# Patient Record
Sex: FEMALE | Race: WHITE | Employment: OTHER | URBAN - METROPOLITAN AREA
[De-identification: names, ages, dates, MRNs, and addresses within clinical notes are randomized per-mention and may not be internally consistent; named-entity substitution may affect disease eponyms.]

---

## 2020-03-17 ENCOUNTER — INITIAL CONSULT (OUTPATIENT)
Dept: GASTROENTEROLOGY | Age: 65
End: 2020-03-17
Payer: MEDICARE

## 2020-03-17 VITALS
HEIGHT: 66 IN | DIASTOLIC BLOOD PRESSURE: 70 MMHG | SYSTOLIC BLOOD PRESSURE: 122 MMHG | BODY MASS INDEX: 29.41 KG/M2 | WEIGHT: 183 LBS

## 2020-03-17 PROCEDURE — 1090F PRES/ABSN URINE INCON ASSESS: CPT | Performed by: INTERNAL MEDICINE

## 2020-03-17 PROCEDURE — 1036F TOBACCO NON-USER: CPT | Performed by: INTERNAL MEDICINE

## 2020-03-17 PROCEDURE — 1123F ACP DISCUSS/DSCN MKR DOCD: CPT | Performed by: INTERNAL MEDICINE

## 2020-03-17 PROCEDURE — G8417 CALC BMI ABV UP PARAM F/U: HCPCS | Performed by: INTERNAL MEDICINE

## 2020-03-17 PROCEDURE — 3017F COLORECTAL CA SCREEN DOC REV: CPT | Performed by: INTERNAL MEDICINE

## 2020-03-17 PROCEDURE — 4040F PNEUMOC VAC/ADMIN/RCVD: CPT | Performed by: INTERNAL MEDICINE

## 2020-03-17 PROCEDURE — G8400 PT W/DXA NO RESULTS DOC: HCPCS | Performed by: INTERNAL MEDICINE

## 2020-03-17 PROCEDURE — G8427 DOCREV CUR MEDS BY ELIG CLIN: HCPCS | Performed by: INTERNAL MEDICINE

## 2020-03-17 PROCEDURE — 99204 OFFICE O/P NEW MOD 45 MIN: CPT | Performed by: INTERNAL MEDICINE

## 2020-03-17 PROCEDURE — G8484 FLU IMMUNIZE NO ADMIN: HCPCS | Performed by: INTERNAL MEDICINE

## 2020-03-17 RX ORDER — LORATADINE 10 MG/1
10 TABLET ORAL DAILY
COMMUNITY

## 2020-03-17 RX ORDER — LEVOTHYROXINE SODIUM 0.03 MG/1
TABLET ORAL
COMMUNITY
Start: 2019-10-14

## 2020-03-17 RX ORDER — OMEPRAZOLE 40 MG/1
CAPSULE, DELAYED RELEASE ORAL DAILY
COMMUNITY

## 2020-03-17 RX ORDER — LORAZEPAM 1 MG/1
TABLET ORAL
COMMUNITY
Start: 2019-06-10

## 2020-03-17 RX ORDER — METFORMIN HYDROCHLORIDE 500 MG/1
TABLET, EXTENDED RELEASE ORAL
COMMUNITY
Start: 2020-03-09

## 2020-03-17 RX ORDER — ESTRADIOL 0.1 MG/G
CREAM VAGINAL
COMMUNITY

## 2020-03-17 RX ORDER — DIPHENHYDRAMINE HCL 25 MG
CAPSULE ORAL EVERY 6 HOURS PRN
COMMUNITY

## 2020-03-17 RX ORDER — ESCITALOPRAM OXALATE 10 MG/1
TABLET ORAL
COMMUNITY
Start: 2019-08-12

## 2020-03-17 RX ORDER — OMEPRAZOLE 20 MG/1
20 CAPSULE, DELAYED RELEASE ORAL
Qty: 90 CAPSULE | Refills: 3 | Status: SHIPPED | OUTPATIENT
Start: 2020-03-17 | End: 2020-06-15

## 2020-03-17 RX ORDER — MOMETASONE FUROATE 50 UG/1
SPRAY, METERED NASAL
COMMUNITY

## 2020-03-17 RX ORDER — ATORVASTATIN CALCIUM 10 MG/1
TABLET, FILM COATED ORAL
COMMUNITY
Start: 2020-03-04

## 2020-03-17 NOTE — PATIENT INSTRUCTIONS
is required to confirm YOU. Liver function tests - Blood tests to measure the liver function measure levels of substances produced or metabolized by the liver. These levels can help to diagnose YOU and differentiate YOU from alcoholic hepatitis. Levels of two liver enzymes (aspartate aminotransferase [AST] and alanine aminotransferase [ALT]) are elevated in about 90 percent of people with YOU. Other blood tests - Additional blood tests are useful for ruling out other causes of liver disease. These usually include tests for viral hepatitis (hepatitis A, B, or C), and may include tests for less common cause of liver disease. Liver biopsy - Although other tests may suggest a diagnosis of YOU, liver biopsy is required to confirm it. A liver biopsy is also helpful for determining the severity of YOU and may provide clues about the future course of the condition. The procedure involves collecting a small sample of liver tissue, which is sent to a laboratory for microscopic examination and biochemical testing. More detailed information about liver biopsies is available in a separate topic review. NONALCOHOLIC STEATOHEPATITIS TREATMENT - There is no cure for YOU. Treatment aims to control the conditions that are associated with YOU, such as obesity, diabetes, and hyperlipidemia. Several experimental treatments are being studied with drugs that treat insulin resistance. Weight loss - Weight reduction can help to reduce levels of liver enzymes, insulin, and can improve quality of life. Weight loss should be gradual (no more than 3.5 lbs or 1.6 kg per week) since rapid weight loss has been associated with worsening of liver disease. A healthcare provider or nutritionist can provide an individualized weight loss plan. Treatment of insulin resistance - Several drugs are available for people with insulin resistance, and they are being studied in patients with YOU.  Their role is not yet proven. More information about treatments for insulin resistance is available in a separate topic review. Miscellaneous drugs - Several new drugs are being tested in patients with YOU but none has yet proven to be beneficial in large, long-term studies. NONALCOHOLIC STEATOHEPATITIS PROGNOSIS - YOU is typically a chronic condition (ie, it persists for many years). It is difficult to predict the course of YOU in an individual. Few factors have been useful in predicting the course of this condition, although features in the liver biopsy can be helpful. The good news is most people with YOU will not develop serious liver problems. One study showed that most people with YOU live as long as those without it. Furthermore, liver function tests are stable over time in most people with YOU. However, YOU can progress in some people. One study that tracked liver damage over time showed that the condition improved in about 3 percent of people, remained stable in 54 percent of people, and worsened in 43 percent of people [1]. The most serious complication of YOU is cirrhosis, which occurs when the liver becomes severely scarred. In one study, between 8 and 26 percent of people with YOU developed cirrhosis [1]. Older diabetic women may be at increased risk. WHERE TO GET MORE INFORMATION - Your healthcare provider is the best source of information for questions and concerns related to your medical problem. This article will be updated as needed every four months on our web site ([email or web address]). The following organizations also provide reliable health information. [employer]          (www.nlm.nih.gov/medlineplus/healthtopics. html)        [employer] Diabetes and Digestive and Kidney Diseases          (www.niddk.nih.gov)        The American Association for the Study of Liver Diseases          (www.aasld. org)        [employer]

## 2020-03-17 NOTE — PROGRESS NOTES
Result Information   Raman, Rad Results In - 03/07/2020  2:39 PM EST    CT ABDOMEN AND PELVIS WITH CONTRAST,  3/7/2020 2:00 PM    CLINICAL HISTORY:  R10.32-Left lower quadrant pain-ICD-10-CM      COMPARISON:  4/20/2017    PROCEDURE COMMENTS:  Multi-detector volumetric scanning of the abdomen and  pelvis with multiplanar reformatting. 100 mL Isovue 370 IV contrast given along  with radiodense GI contrast. Automated exposure control for dose reduction was  used. CTDIvol: 7.3 mGy. DLP: 364 mGy-cm. FINDINGS:      LOWER THORAX:  Lung bases unremarkable. ABDOMEN AND PELVIS: Liver, spleen, pancreas, and adrenal glands are  unremarkable. Kidneys enhance and are nonhydronephrotic. Unremarkable biliary  system. No bowel obstruction or acute inflammatory process. No evidence of appendicitis. Pelvic viscera unremarkable. No free fluid or adenopathy. No acute osseous lesion identified. IMPRESSION:  No acute abnormality of the abdomen or pelvis. HPI elements: location, severity, timing, modifying factors, quality, duration, context and associated signs/symptoms. Last Encounter Reviewed: 4/27/2017 Sukhi Sanabria Was recently in the er with complains of chest discomfort. She also has epigastric pain and bloating. Has lost 10lbs recently. Gives a h/o gluten intolerance. No dysphagia. Just started prilosec. Pertinent PMH, FH, SH is reviewed below. Last EGD: 3/7/2016 hiatal hernia  Last Colonoscopy: 3/7/2016 polyp    Review of available records reveals:   Wt Readings from Last 50 Encounters:   03/17/20 183 lb (83 kg)       No components found for: HGBA1C  BP Readings from Last 3 Encounters:   03/17/20 122/70     Health Maintenance   Topic Date Due    Hepatitis C screen  1955    Lipid screen  03/23/1964    HIV screen  03/23/1969    DTaP/Tdap/Td vaccine (1 - Tdap) 03/23/1973    Cervical cancer screen  03/23/1975    Breast cancer screen  03/23/1994    Diabetes screen  03/23/1994    Shingles Vaccine (1 of 2) 03/23/2004    Colon cancer screen colonoscopy  03/23/2004    DEXA (modify frequency per FRAX score)  03/23/2019    Pneumococcal 65+ years Vaccine (1 of 1 - PPSV23) 03/23/2019    Flu vaccine (1) 09/01/2019    Hepatitis A vaccine  Aged Out    Hepatitis B vaccine  Aged Out    Hib vaccine  Aged Out    Meningococcal (ACWY) vaccine  Aged Out       No components found for: [facility]     PAST MEDICAL HISTORY     Past Medical History:   Diagnosis Date    Breast cancer ([address]     Diabetes ([address]     Stroke ([facility] 12/31/2017     FAMILY HISTORY   History reviewed. No pertinent family history.   SOCIAL HISTORY     Social History     Socioeconomic History    Marital status:      Spouse name: Not on file    Number of children: Not on file    Years of education: Not on file    Highest education level: Not on file   Occupational History    Not on file   Social Needs    Financial resource strain: Not on file    Food insecurity     Worry: Not on file     Inability: Not on file    Transportation needs     Medical: Not on file     Non-medical: Not on file   Tobacco Use    Smoking status: Never Smoker    Smokeless tobacco: Never Used   Substance and Sexual Activity    Alcohol use: Not Currently    Drug use: Never    Sexual activity: Yes     Partners: Male   Lifestyle    Physical activity     Days per week: Not on file     Minutes per session: Not on file    Stress: Not on file   Relationships    Social connections     Talks on phone: Not on file     Gets together: Not on file     Attends Protestant service: Not on file     Active member of club or organization: Not on file     Attends meetings of clubs or organizations: Not on file     Relationship status: Not on file    Intimate partner violence     Fear of current or ex partner: Not on file     Emotionally abused: Not on file     Physically abused: Not on file     Forced sexual activity: Not on file   Other Topics Concern    Not on file   Social History Narrative    Not on file     SURGICAL HISTORY     Past Surgical History:   Procedure Laterality Date    BRAIN SURGERY       CURRENT MEDICATIONS   (This list may include medications prescribed during this encounter as epic can not insert only the list prior to this encounter.)  Current Outpatient Rx   Medication Sig Dispense Refill    metFORMIN (GLUCOPHAGE-XR) 500 MG extended release tablet TAKE ONE TABLET BY MOUTH DAILY WITH BREAKFAST      LORazepam (ATIVAN) 1 MG tablet TAKE ONE TABLET BY MOUTH TWICE A DAY AS NEEDED FOR ANXIETY      loratadine (CLARITIN) 10 MG tablet Take 10 mg by mouth daily      levothyroxine (SYNTHROID) 25 MCG tablet TAKE ONE TABLET BY MOUTH DAILY      escitalopram (LEXAPRO) 10 MG tablet TAKE ONE TABLET BY MOUTH DAILY      diphenhydrAMINE (BENADRYL ALLERGY) 25 MG capsule Take by mouth every 6 hours as needed      LACTOBACILLUS ACID-PECTIN PO Take by mouth daily      Calcium Carbonate Antacid (TUMS E-X PO) Take by mouth      atorvastatin (LIPITOR) 10 MG tablet TAKE ONE TABLET BY MOUTH DAILY      estradiol (ESTRACE) 0.1 MG/GM vaginal cream       mometasone (NASONEX) 50 MCG/ACT nasal spray       omeprazole (PRILOSEC) 40 MG delayed release capsule Take by mouth daily      Cholecalciferol (VITAMIN D3) 25 MCG (1000 UT) CAPS Take 1 tablet by mouth daily      omeprazole (PRILOSEC) 20 MG delayed release capsule Take 1 capsule by mouth every morning (before breakfast) 90 capsule 3     ALLERGIES     Allergies   Allergen Reactions    Latex Itching    Erythromycin Nausea And Vomiting    Sulfa Antibiotics Nausea Only    Amoxicillin-Pot Clavulanate Nausea Only     diarrhea  diarrhea  diarrhea      Codeine Anxiety    Hydrocodone-Acetaminophen Itching    Morphine Itching    Other Itching    Pentazocine      Other reaction(s):  Other (See Comments)  Unknown       Pentazocine Lactate Itching     IMMUNIZATIONS     There is no immunization history on file for this patient. REVIEW OF SYSTEMS   See HPI for further details and pertinent postiives. Negative for the following:  Constitutional: Negative for weight change. Negative for appetite change and fatigue. HENT: Negative for nosebleeds, sore throat, mouth sores, and voice change. Respiratory: Negative for cough, choking and chest tightness. Cardiovascular: Negative for chest pain   Gastrointestinal: See HPI  Musculoskeletal: Negative for arthralgias. Skin: Negative for pallor. Neurological: Negative for weakness and light-headedness. Hematological: Negative for adenopathy. Does not bruise/bleed easily. Psychiatric/Behavioral: Negative for suicidal ideas. PHYSICAL EXAM   VITAL SIGNS: /70 (Site: Left Upper Arm, Position: Sitting, Cuff Size: Small Adult)   Ht 5' 6\" (1.676 m)   Wt 183 lb (83 kg)   BMI 29.54 kg/m²   Wt Readings from Last 3 Encounters:   03/17/20 183 lb (83 kg)     Constitutional: Well developed, Well nourished, No acute distress, Non-toxic appearance. HENT: Normocephalic, Atraumatic, Bilateral external ears normal, Oropharynx moist, No oral exudates, Nose normal.   Eyes: Conjunctiva normal, No discharge. Neck: Normal range of motion, No tenderness, Supple, No stridor. Lymphatic: No cervical, subclavian, or axillary lymphadenopathy. Cardiovascular: Normal heart rate, Normal rhythm, No murmurs, No rubs, No gallops. Thorax & Lungs: Normal breath sounds, No respiratory distress, No wheezing, No chest tenderness. No gynecomastia. Abdomen: scars consistent with stated surgeries, no hernias, no HSM, soft NTND   Rectal:  Deferred. Skin: Warm, Dry, No erythema, No rash. No bruising. No spider hemangiomas. Back: No tenderness, No CVA tenderness. Lower Extremities: Intact distal pulses, No edema, No tenderness, No cyanosis, No clubbing. Neurologic: Alert & oriented x 3, Normal motor function, Normal sensory function, No focal deficits noted. No asterixis.   RADIOLOGY/PROCEDURES FINAL IMPRESSION     Orders Placed This Encounter   Procedures    Misc nursing order (specify)     Standing Status:   Future     Standing Expiration Date:   4/17/2020     Scheduling Instructions:      Schedule Fibroscan test through [facility] endoscopy. Rodger Salazar was seen today for new patient. Diagnoses and all orders for this visit:    Fatty liver  -     Misc nursing order (specify); Future    Epigastric pain  -     omeprazole (PRILOSEC) 20 MG delayed release capsule; Take 1 capsule by mouth every morning (before breakfast)    Discussed doing another EGD if no better but in light of the covid19 pandemic we are not scheduling elective procedures at this time. Fatty liver may also be causing her pain. Discussed lifestyle modification with goal of weight loss, need for fibrosis assessment along with regular use of vit E 400 bid and regular coffee consumption may all be beneficial.  Fibroscan when cause of liver disease is know may be advantageous over liver biopsy as multiple measurements are taken compared to the argument of sampling error on a single biopsy. At present there are no additional pharmacological interventions approved to reverse fibrosis but these may become available in the next 1-2 years. As little as a 7% weight loss may be enough to affect disease course. Lifestyle modification and utilization of health coaches, psychologists, dieticians, exercise physiologists, culinary medicine could all help. Discussed additional weight loss options (surgical sleeve gastrectomy, endoscopic options offered at other centers and medications such as Contrave, Belvique, and/or phenteramine) available in additional to lifestyle interventions like healthy diet with carb control and regular exercise.      Targets for endoscopic therapies include bypass the proximal duodenum and/or stomach, compartmentalize the stomach and restrict volume, delay stomach emptying with partial outlet blockade,

## 2020-03-18 ENCOUNTER — TELEPHONE (OUTPATIENT)
Dept: GASTROENTEROLOGY | Age: 65
End: 2020-03-18

## 2020-03-18 NOTE — TELEPHONE ENCOUNTER
[phone] ([place])     Called and spoke with pt. Pt's questions answered. After 10:36 of talking to pt, she decided to cancel her fibroscan and will call back to reschedule after the coronavirus calms down.

## 2020-04-27 ENCOUNTER — TELEPHONE (OUTPATIENT)
Dept: GASTROENTEROLOGY | Age: 65
End: 2020-04-27

## 2020-04-28 NOTE — TELEPHONE ENCOUNTER
We are supposed to open the office back up for actual visits next week. Apts will be limited to every half hour, patients will not be allowed to wait in the waiting room and taking directly to the exam room and everyone will be required to wear masks. Alternative video visit follow up will continue to be available and would probably be perfect to address any questions and ongoing symptoms patients have. Ideally since volume will still be down the best thing would be to schedule a live patient alternating with a video visit every 15 minutes. We are still awaiting guidance on procedures. For endoscopic procedures a negative covid-19 test within 72 hours will be required. These patients will all need to be sent to our flu clinics with an order for this test.  Fibroscan is not aerosolizing thus if they start doing them again next week it believe we just make sure the patient has not symptoms of fever, cough, etc in the last 2 weeks and everyone will be required to wear masks.

## 2020-05-04 NOTE — TELEPHONE ENCOUNTER
We are allowed to schedule Fibroscans per endoscopy. Left message asking patient to return call to office.

## 2020-05-07 ENCOUNTER — HOSPITAL ENCOUNTER (OUTPATIENT)
Dept: ENDOSCOPY | Age: 65
Setting detail: OUTPATIENT SURGERY
Discharge: HOME | End: 2020-05-07
Payer: MEDICARE

## 2020-05-07 PROCEDURE — 91200 LIVER ELASTOGRAPHY: CPT

## 2020-05-13 ENCOUNTER — TELEPHONE (OUTPATIENT)
Dept: GASTROENTEROLOGY | Age: 65
End: 2020-05-13